# Patient Record
Sex: FEMALE
[De-identification: names, ages, dates, MRNs, and addresses within clinical notes are randomized per-mention and may not be internally consistent; named-entity substitution may affect disease eponyms.]

---

## 2023-10-16 ENCOUNTER — P2P PATIENT RECORD (OUTPATIENT)
Age: 78
End: 2023-10-16

## 2023-12-04 ENCOUNTER — LAB OUTSIDE AN ENCOUNTER (OUTPATIENT)
Dept: URBAN - METROPOLITAN AREA CLINIC 27 | Facility: CLINIC | Age: 78
End: 2023-12-04

## 2023-12-04 ENCOUNTER — DASHBOARD ENCOUNTERS (OUTPATIENT)
Age: 78
End: 2023-12-04

## 2023-12-04 ENCOUNTER — OFFICE VISIT (OUTPATIENT)
Dept: URBAN - METROPOLITAN AREA CLINIC 27 | Facility: CLINIC | Age: 78
End: 2023-12-04
Payer: COMMERCIAL

## 2023-12-04 VITALS
HEIGHT: 58 IN | RESPIRATION RATE: 16 BRPM | HEART RATE: 74 BPM | BODY MASS INDEX: 18.26 KG/M2 | DIASTOLIC BLOOD PRESSURE: 84 MMHG | SYSTOLIC BLOOD PRESSURE: 95 MMHG | WEIGHT: 87 LBS

## 2023-12-04 DIAGNOSIS — D64.89 ANEMIA DUE TO OTHER CAUSE, NOT CLASSIFIED: ICD-10-CM

## 2023-12-04 PROCEDURE — 99204 OFFICE O/P NEW MOD 45 MIN: CPT | Performed by: INTERNAL MEDICINE

## 2023-12-04 PROCEDURE — 99244 OFF/OP CNSLTJ NEW/EST MOD 40: CPT | Performed by: INTERNAL MEDICINE

## 2023-12-04 NOTE — HPI-TODAY'S VISIT:
This is a 78-year-old female seen as a new patient in consultation at the request of Dr. Zaman for new anemia.  She has a hemoglobin of 11.  I do not see iron studies.  He has placed her on iron replacement.  Over the past year she has lost about 10 pounds.  Her last colonoscopy was 2011 which she states was normal.  No family history of colon cancer or polyps.  She is on no medicines.  She generally feels well.  Occasionally she will feel little bit fatigued but no specific complaints or change in bowel habits.

## 2023-12-05 LAB
ABSOLUTE BASOPHILS: 29
ABSOLUTE EOSINOPHILS: 111
ABSOLUTE LYMPHOCYTES: 2341
ABSOLUTE MONOCYTES: 312
ABSOLUTE NEUTROPHILS: 1308
BASOPHILS: 0.7
EOSINOPHILS: 2.7
FERRITIN, SERUM: 42
FOLATE (FOLIC ACID), SERUM: 15.2
HEMATOCRIT: 33.1
HEMOGLOBIN: 11.1
IRON BIND.CAP.(TIBC): 324
IRON SATURATION: 21
IRON: 69
LYMPHOCYTES: 57.1
MCH: 29.6
MCHC: 33.5
MCV: 88.3
MONOCYTES: 7.6
MPV: 11.4
NEUTROPHILS: 31.9
PLATELET COUNT: 213
RDW: 14.5
RED BLOOD CELL COUNT: 3.75
RETICULOCYTE COUNT: 1.3
RETICULOCYTE, ABSOLUTE: (no result)
VITAMIN B12: 1216
WHITE BLOOD CELL COUNT: 4.1

## 2024-01-16 ENCOUNTER — OFFICE VISIT (OUTPATIENT)
Dept: URBAN - METROPOLITAN AREA SURGERY CENTER 7 | Facility: SURGERY CENTER | Age: 79
End: 2024-01-16

## 2024-10-23 ENCOUNTER — LAB OUTSIDE AN ENCOUNTER (OUTPATIENT)
Dept: URBAN - METROPOLITAN AREA CLINIC 27 | Facility: CLINIC | Age: 79
End: 2024-10-23

## 2024-10-23 ENCOUNTER — OFFICE VISIT (OUTPATIENT)
Dept: URBAN - METROPOLITAN AREA CLINIC 27 | Facility: CLINIC | Age: 79
End: 2024-10-23
Payer: COMMERCIAL

## 2024-10-23 VITALS
SYSTOLIC BLOOD PRESSURE: 103 MMHG | HEART RATE: 80 BPM | HEIGHT: 58 IN | WEIGHT: 95 LBS | BODY MASS INDEX: 19.94 KG/M2 | DIASTOLIC BLOOD PRESSURE: 62 MMHG

## 2024-10-23 DIAGNOSIS — R10.13 EPIGASTRIC ABDOMINAL PAIN: ICD-10-CM

## 2024-10-23 PROCEDURE — 99244 OFF/OP CNSLTJ NEW/EST MOD 40: CPT | Performed by: PHYSICIAN ASSISTANT

## 2024-10-23 PROCEDURE — 99204 OFFICE O/P NEW MOD 45 MIN: CPT | Performed by: PHYSICIAN ASSISTANT

## 2024-10-23 NOTE — HPI-TODAY'S VISIT:
Ms. Shelby is a 79-year-old female seen at the request of Dr. Kemar Trinidad for a flutter sensation in her epigastrium. She describes it as a hiccup like sensation but more fluttering in nature over the past 5 weeks. The frequency is 5 times per day. It does not disturb her sleep. She reports an associated fullness. She has occasional mild nausea without vomiting. She denies dysphagia, odynophagia, abd pain. She reports a 6 lb weight loss over the past few years. Her baseline weight is 99 lbs. Now 93 lbs. She doesn't want to undergo any procedures that require sedation.

## 2024-11-20 ENCOUNTER — OFFICE VISIT (OUTPATIENT)
Dept: URBAN - METROPOLITAN AREA CLINIC 27 | Facility: CLINIC | Age: 79
End: 2024-11-20
Payer: COMMERCIAL

## 2024-11-20 VITALS
DIASTOLIC BLOOD PRESSURE: 58 MMHG | HEIGHT: 58 IN | BODY MASS INDEX: 20.15 KG/M2 | WEIGHT: 96 LBS | HEART RATE: 80 BPM | SYSTOLIC BLOOD PRESSURE: 94 MMHG

## 2024-11-20 DIAGNOSIS — R10.13 EPIGASTRIC PAIN: ICD-10-CM

## 2024-11-20 PROCEDURE — 99213 OFFICE O/P EST LOW 20 MIN: CPT | Performed by: PHYSICIAN ASSISTANT

## 2024-11-20 NOTE — HPI-TODAY'S VISIT:
Ms. Shelby is a 79-year-old female seen at the request of Dr. Kmear Trinidad for a flutter/jumping sensation in her epigastrium. She describes it as a hiccup or jumping sensation but more fluttering in nature over the past 2-3 months. The frequency is random, but occurs several times per day. Not affected by eating. It does not disturb her sleep. She reports an associated fullness and some LUQ tightness/band like sensation. She denies heartburn/reflux, dysphagia, odynophagia or abd pain. She previously lost 6 lb weight loss. However, her weight is stable from her last OV 1 month ago. Her baseline weight is 99 lbs. Now 93 lbs. She doesn't want to undergo any procedures that require sedation.  She currently does not take Rx or OTC meds.

## 2024-11-25 LAB — H PYLORI BREATH TEST: NOT DETECTED

## 2025-01-08 ENCOUNTER — OFFICE VISIT (OUTPATIENT)
Dept: URBAN - METROPOLITAN AREA CLINIC 27 | Facility: CLINIC | Age: 80
End: 2025-01-08
Payer: COMMERCIAL

## 2025-01-08 VITALS
DIASTOLIC BLOOD PRESSURE: 66 MMHG | WEIGHT: 95 LBS | HEIGHT: 58 IN | BODY MASS INDEX: 19.94 KG/M2 | HEART RATE: 85 BPM | SYSTOLIC BLOOD PRESSURE: 109 MMHG

## 2025-01-08 DIAGNOSIS — R10.13 EPIGASTRIC PAIN: ICD-10-CM

## 2025-01-08 PROCEDURE — 99214 OFFICE O/P EST MOD 30 MIN: CPT | Performed by: INTERNAL MEDICINE

## 2025-01-08 RX ORDER — CIPROFLOXACIN 500 MG/1
TABLET ORAL
Qty: 14 TABLET | Status: ON HOLD | COMMUNITY

## 2025-01-08 RX ORDER — CIPROFLOXACIN 500 MG/1
TABLET ORAL
Qty: 14 TABLET | Status: ACTIVE | COMMUNITY

## 2025-01-08 NOTE — HPI-TODAY'S VISIT:
ThisIs a 79-year-old female seen in consultation for jumpiness that she perceives in the epigastric area.  Overall she is doing well.  That seems to be much better.  It is only occurring maybe once a week for a few minutes.  She has gained weight recently and is at 95 pounds.  Ultrasound was unrevealing.  Upper GI showed a hiatal hernia but was otherwise normal.  She is moving back to her baseline and feeling better

## 2025-01-09 ENCOUNTER — LAB OUTSIDE AN ENCOUNTER (OUTPATIENT)
Dept: URBAN - METROPOLITAN AREA CLINIC 27 | Facility: CLINIC | Age: 80
End: 2025-01-09

## 2025-04-22 ENCOUNTER — CLAIMS CREATED FROM THE CLAIM WINDOW (OUTPATIENT)
Dept: URBAN - METROPOLITAN AREA SURGERY CENTER 7 | Facility: SURGERY CENTER | Age: 80
End: 2025-04-22
Payer: COMMERCIAL

## 2025-04-22 ENCOUNTER — CLAIMS CREATED FROM THE CLAIM WINDOW (OUTPATIENT)
Dept: URBAN - METROPOLITAN AREA CLINIC 4 | Facility: CLINIC | Age: 80
End: 2025-04-22
Payer: COMMERCIAL

## 2025-04-22 DIAGNOSIS — D12.4 ADENOMATOUS POLYP OF DESCENDING COLON: ICD-10-CM

## 2025-04-22 DIAGNOSIS — Z12.11 COLON CANCER SCREENING (HIGH RISK): ICD-10-CM

## 2025-04-22 DIAGNOSIS — K63.5 POLYP OF COLON: ICD-10-CM

## 2025-04-22 DIAGNOSIS — D12.2 ADENOMATOUS POLYP OF ASCENDING COLON: ICD-10-CM

## 2025-04-22 DIAGNOSIS — K63.89 OTHER SPECIFIED DISEASES OF INTESTINE: ICD-10-CM

## 2025-04-22 DIAGNOSIS — Z12.11 COLON CANCER SCREENING: ICD-10-CM

## 2025-04-22 DIAGNOSIS — Z12.11 ENCOUNTER FOR SCREENING FOR MALIGNANT NEOPLASM OF COLON: ICD-10-CM

## 2025-04-22 PROCEDURE — 45380 COLONOSCOPY AND BIOPSY: CPT | Performed by: INTERNAL MEDICINE

## 2025-04-22 PROCEDURE — 00811 ANES LWR INTST NDSC NOS: CPT | Performed by: NURSE ANESTHETIST, CERTIFIED REGISTERED

## 2025-04-22 PROCEDURE — 45385 COLONOSCOPY W/LESION REMOVAL: CPT | Performed by: INTERNAL MEDICINE

## 2025-04-22 PROCEDURE — 00812 ANES LWR INTST SCR COLSC: CPT | Performed by: NURSE ANESTHETIST, CERTIFIED REGISTERED

## 2025-04-22 PROCEDURE — 88305 TISSUE EXAM BY PATHOLOGIST: CPT | Performed by: PATHOLOGY

## 2025-04-22 RX ORDER — CIPROFLOXACIN 500 MG/1
TABLET ORAL
Qty: 14 TABLET | Status: ACTIVE | COMMUNITY

## 2025-04-22 RX ORDER — CIPROFLOXACIN 500 MG/1
TABLET ORAL
Qty: 14 TABLET | Status: ON HOLD | COMMUNITY